# Patient Record
Sex: MALE | Race: WHITE | URBAN - METROPOLITAN AREA
[De-identification: names, ages, dates, MRNs, and addresses within clinical notes are randomized per-mention and may not be internally consistent; named-entity substitution may affect disease eponyms.]

---

## 2021-10-26 NOTE — PROGRESS NOTES
FAMILY MEDICINE CLINIC NOTE    HPI  Leon Huffman is a 35year old male presenting for physical    #Health Maintenance  -Diet: No strict diet - trying to watch what he eats. Not as much fruits and vegetables. Not a sweets person. Likes salty foods.    - Allergies    MEDICATIONS  No current outpatient medications on file. ACTIVE PROBLEM  Patient Active Problem List:     Trigger middle finger of left hand     Numbness     Health maintenance examination     Obesity (BMI 30-39. 9)      PAST MEDICAL HISTO file      Lack of Transportation (Non-Medical):  Not on file  Physical Activity:       Days of Exercise per Week: Not on file      Minutes of Exercise per Session: Not on file  Stress:       Feeling of Stress : Not on file  Social Connections:       Deisy Harry masses  MSK: No edema  SKIN: Warm and dry, no rashes  NEURO: Answering questions appropriately    LABS  No results found for: WBC, HGB, HCT, PLT, NEPERCENT, LYPERCENT, MOPERCENT, EOPERCENT, BAPERCENT, NE, LYMABS, MOABSO, EOABSO, BAABSO, REITCPERCENT    No PLATELET    COMP METABOLIC PANEL (14)    HCV ANTIBODY    HEMOGLOBIN A1C    HIV AG AB COMBO    LIPID PANEL    PSA SCREEN    TSH W REFLEX TO FREE T4    Obesity (BMI 30-39. 9)     Exercise and diet discussed.            Other Visit Diagnoses     Influenza vacci

## 2021-10-27 ENCOUNTER — OFFICE VISIT (OUTPATIENT)
Dept: INTERNAL MEDICINE CLINIC | Facility: CLINIC | Age: 41
End: 2021-10-27
Payer: COMMERCIAL

## 2021-10-27 VITALS
DIASTOLIC BLOOD PRESSURE: 72 MMHG | OXYGEN SATURATION: 98 % | HEIGHT: 67 IN | WEIGHT: 192 LBS | BODY MASS INDEX: 30.13 KG/M2 | HEART RATE: 67 BPM | SYSTOLIC BLOOD PRESSURE: 108 MMHG

## 2021-10-27 DIAGNOSIS — R20.0 NUMBNESS: ICD-10-CM

## 2021-10-27 DIAGNOSIS — Z00.00 HEALTH MAINTENANCE EXAMINATION: Primary | ICD-10-CM

## 2021-10-27 DIAGNOSIS — M65.332 TRIGGER MIDDLE FINGER OF LEFT HAND: ICD-10-CM

## 2021-10-27 DIAGNOSIS — E66.9 OBESITY (BMI 30-39.9): ICD-10-CM

## 2021-10-27 DIAGNOSIS — Z28.21 INFLUENZA VACCINATION DECLINED: ICD-10-CM

## 2021-10-27 PROBLEM — E78.5 DYSLIPIDEMIA: Status: ACTIVE | Noted: 2021-10-27

## 2021-10-27 PROCEDURE — 87389 HIV-1 AG W/HIV-1&-2 AB AG IA: CPT | Performed by: FAMILY MEDICINE

## 2021-10-27 PROCEDURE — 36415 COLL VENOUS BLD VENIPUNCTURE: CPT | Performed by: FAMILY MEDICINE

## 2021-10-27 PROCEDURE — 3074F SYST BP LT 130 MM HG: CPT | Performed by: FAMILY MEDICINE

## 2021-10-27 PROCEDURE — 80061 LIPID PANEL: CPT | Performed by: FAMILY MEDICINE

## 2021-10-27 PROCEDURE — 96127 BRIEF EMOTIONAL/BEHAV ASSMT: CPT | Performed by: FAMILY MEDICINE

## 2021-10-27 PROCEDURE — 80050 GENERAL HEALTH PANEL: CPT | Performed by: FAMILY MEDICINE

## 2021-10-27 PROCEDURE — 84153 ASSAY OF PSA TOTAL: CPT | Performed by: FAMILY MEDICINE

## 2021-10-27 PROCEDURE — 83036 HEMOGLOBIN GLYCOSYLATED A1C: CPT | Performed by: FAMILY MEDICINE

## 2021-10-27 PROCEDURE — 3078F DIAST BP <80 MM HG: CPT | Performed by: FAMILY MEDICINE

## 2021-10-27 PROCEDURE — 86803 HEPATITIS C AB TEST: CPT | Performed by: FAMILY MEDICINE

## 2021-10-27 PROCEDURE — 99386 PREV VISIT NEW AGE 40-64: CPT | Performed by: FAMILY MEDICINE

## 2021-10-27 PROCEDURE — 3008F BODY MASS INDEX DOCD: CPT | Performed by: FAMILY MEDICINE

## 2021-10-27 NOTE — ASSESSMENT & PLAN NOTE
Patient with occasional numbness in the fourth and fifth digits of his right hand while sleeping. He he does note that he often sleeps with his elbows bent, suspect this is related to compression of the ulnar nerve.   Recommend that he should sleep with

## 2021-10-27 NOTE — ASSESSMENT & PLAN NOTE
Patient with observed trigger finger of the left third digit. Not bothering him significantly at this time. Can use ibuprofen as needed for now. If worsening or bothersome, can come and see me for trigger finger injection.   Can also consider referral to

## 2021-10-27 NOTE — PATIENT INSTRUCTIONS
PATIENT INSTRUCTIONS    • Thank you for seeing me today, it was a pleasure taking care of you. • Please check out at the  and schedule a follow up appointment.   Return in about 1 year (around 10/27/2022) for physical.  • Please get your labs caridad

## 2021-10-27 NOTE — PROGRESS NOTES
Pt presented to clinic today for blood draw. Per physician able to draw orders. Orders  documented within chart. Pt tolerated lab draw well.  verified.   Orders drawn include:   CBC WITH DIFFERENTIAL WITH PLATELET   COMP METABOLIC PANEL (14)   HCV ANTIBO

## 2021-10-27 NOTE — ASSESSMENT & PLAN NOTE
Patient unsure if he is planning to formally establish care here - mostly needs health forms completed and unable to schedule in on time with his prior PCP. Exercise and diet advised.   CBC, CMP, lipid panel, Hba1c, TSH, HIV screen, hepatitis C screen, PSA

## 2021-10-28 ENCOUNTER — TELEPHONE (OUTPATIENT)
Dept: INTERNAL MEDICINE CLINIC | Facility: CLINIC | Age: 41
End: 2021-10-28